# Patient Record
Sex: MALE | Race: WHITE
[De-identification: names, ages, dates, MRNs, and addresses within clinical notes are randomized per-mention and may not be internally consistent; named-entity substitution may affect disease eponyms.]

---

## 2017-05-02 ENCOUNTER — HOSPITAL ENCOUNTER (OUTPATIENT)
Dept: HOSPITAL 57 - BURCT | Age: 57
Discharge: HOME | End: 2017-05-02
Attending: FAMILY MEDICINE
Payer: COMMERCIAL

## 2017-05-02 DIAGNOSIS — G43.109: Primary | ICD-10-CM

## 2017-05-02 PROCEDURE — 70470 CT HEAD/BRAIN W/O & W/DYE: CPT

## 2017-05-03 NOTE — CT
CT OF THE BRAIN WITH AND WITHOUT CONTRAST:

 

Date: 5-2-17 

 

Technique: Scans were done pre and post IV contrast for evaluation of ocular migraines. 

 

FINDINGS: 

The ventricles are normal in size with no shift. No intracranial bleeding, mass, or sign of acute st
roke was found. Once IV contrast was given, there were no areas of pathological enhancement. The sku
ll appears normal. The visualize paranasal sinuses and mastoid air cells are clear. 

 

IMPRESSION: 

Unremarkable CT of the brain. 

 

POS: HOME

## 2018-01-23 ENCOUNTER — HOSPITAL ENCOUNTER (OUTPATIENT)
Dept: HOSPITAL 57 - BURRAD | Age: 58
Discharge: HOME | End: 2018-01-23
Attending: FAMILY MEDICINE
Payer: COMMERCIAL

## 2018-01-23 DIAGNOSIS — J40: Primary | ICD-10-CM

## 2018-01-23 PROCEDURE — 71046 X-RAY EXAM CHEST 2 VIEWS: CPT

## 2019-07-08 ENCOUNTER — HOSPITAL ENCOUNTER (OUTPATIENT)
Dept: HOSPITAL 57 - BURRAD | Age: 59
Discharge: HOME | End: 2019-07-08
Attending: NURSE PRACTITIONER
Payer: COMMERCIAL

## 2019-07-08 DIAGNOSIS — R09.89: Primary | ICD-10-CM

## 2019-07-08 PROCEDURE — 71046 X-RAY EXAM CHEST 2 VIEWS: CPT

## 2019-07-08 NOTE — RAD
CHEST TWO VIEWS:

 

07/08/2019

 

COMPARISON:

01/23/2018

 

FINDINGS:

The heart is normal in size.  No acute infiltrate or effusion is seen.  Mild elevation of the right h
emidiaphragm is no different than before.  A small, subcentimeter nodular density is seen to the left
 of the left pulmonary artery.  It has not changed over time and is most likely a granuloma.

 

IMPRESSION:

No significant thoracic finding.

 

POS: HOME

## 2020-12-04 ENCOUNTER — HOSPITAL ENCOUNTER (OUTPATIENT)
Dept: HOSPITAL 57 - BURCT | Age: 60
Discharge: HOME | End: 2020-12-04
Payer: COMMERCIAL

## 2020-12-04 DIAGNOSIS — D35.2: Primary | ICD-10-CM

## 2020-12-04 DIAGNOSIS — R51.9: ICD-10-CM

## 2020-12-04 DIAGNOSIS — T67.6XXA: ICD-10-CM

## 2020-12-04 LAB
ALBUMIN SERPL BCG-MCNC: 4.4 G/DL
ALBUMIN SERPL BCG-MCNC: 4.5 G/DL (ref 3.5–5)
ALP SERPL-CCNC: 63 U/L (ref 40–110)
ALT SERPL W P-5'-P-CCNC: 35 U/L (ref 8–55)
ANION GAP SERPL CALC-SCNC: 14 MMOL/L (ref 10–20)
AST SERPL-CCNC: 21 U/L (ref 5–34)
BILIRUB SERPL-MCNC: 1.1 MG/DL (ref 0.2–1.2)
BUN SERPL-MCNC: 11 MG/DL (ref 8.4–25.7)
CALCIUM SERPL-MCNC: 9.3 MG/DL (ref 7.8–10.44)
CHD RISK SERPL-RTO: 4 (ref ?–4.5)
CHLORIDE SERPL-SCNC: 99 MMOL/L (ref 98–107)
CHOLEST SERPL-MCNC: 198 MG/DL
CO2 SERPL-SCNC: 28 MMOL/L (ref 22–29)
CREAT CL PREDICTED SERPL C-G-VRATE: 0 ML/MIN (ref 70–130)
FERRITIN SERPL-MCNC: 259.38 NG/ML (ref 22–322)
FOLLICLE STIMULATING HORMONE: 4.3 MIU/ML
GLOBULIN SER CALC-MCNC: 2.9 G/DL (ref 2.4–3.5)
GLUCOSE SERPL-MCNC: 97 MG/DL (ref 70–105)
HDLC SERPL-MCNC: 49 MG/DL
HGB BLD-MCNC: 15.8 G/DL (ref 14–18)
LDLC SERPL CALC-MCNC: 130 MG/DL
MCH RBC QN AUTO: 32.7 PG (ref 27–31)
MCV RBC AUTO: 101 FL (ref 78–98)
PLATELET # BLD AUTO: 212 THOU/UL (ref 130–400)
POTASSIUM SERPL-SCNC: 3.9 MMOL/L (ref 3.5–5.1)
RBC # BLD AUTO: 4.82 MILL/UL (ref 4.7–6.1)
SODIUM SERPL-SCNC: 137 MMOL/L (ref 136–145)
T4 FREE SERPL-MCNC: 1.03 NG/DL (ref 0.7–1.48)
TESTOST FREE SERPL-MCNC: 71.5 PG/ML (ref 47–244)
TRIGL SERPL-MCNC: 93 MG/DL (ref ?–150)
TSH SERPL DL<=0.005 MIU/L-ACNC: 1.54 UIU/ML (ref 0.35–4.94)
WBC # BLD AUTO: 8.3 THOU/UL (ref 4.8–10.8)

## 2020-12-04 PROCEDURE — 83835 ASSAY OF METANEPHRINES: CPT

## 2020-12-04 PROCEDURE — 84439 ASSAY OF FREE THYROXINE: CPT

## 2020-12-04 PROCEDURE — 70470 CT HEAD/BRAIN W/O & W/DYE: CPT

## 2020-12-04 PROCEDURE — 80053 COMPREHEN METABOLIC PANEL: CPT

## 2020-12-04 PROCEDURE — 82728 ASSAY OF FERRITIN: CPT

## 2020-12-04 PROCEDURE — 84146 ASSAY OF PROLACTIN: CPT

## 2020-12-04 PROCEDURE — 36415 COLL VENOUS BLD VENIPUNCTURE: CPT

## 2020-12-04 PROCEDURE — 83002 ASSAY OF GONADOTROPIN (LH): CPT

## 2020-12-04 PROCEDURE — 80061 LIPID PANEL: CPT

## 2020-12-04 PROCEDURE — 84403 ASSAY OF TOTAL TESTOSTERONE: CPT

## 2020-12-04 PROCEDURE — 83540 ASSAY OF IRON: CPT

## 2020-12-04 PROCEDURE — 83036 HEMOGLOBIN GLYCOSYLATED A1C: CPT

## 2020-12-04 PROCEDURE — 84270 ASSAY OF SEX HORMONE GLOBUL: CPT

## 2020-12-04 PROCEDURE — 82670 ASSAY OF TOTAL ESTRADIOL: CPT

## 2020-12-04 PROCEDURE — 84443 ASSAY THYROID STIM HORMONE: CPT

## 2020-12-04 PROCEDURE — 85027 COMPLETE CBC AUTOMATED: CPT

## 2020-12-04 PROCEDURE — 83550 IRON BINDING TEST: CPT

## 2020-12-04 PROCEDURE — 85652 RBC SED RATE AUTOMATED: CPT

## 2020-12-04 PROCEDURE — 86376 MICROSOMAL ANTIBODY EACH: CPT

## 2020-12-04 PROCEDURE — 82533 TOTAL CORTISOL: CPT

## 2020-12-04 PROCEDURE — 83001 ASSAY OF GONADOTROPIN (FSH): CPT

## 2020-12-04 NOTE — CT
CT OF THE BRAIN WITH AND WITHOUT CONTRAST

12/4/20

 

Spiral CT of the brain was done for evaluation of headaches. Comparison is made with a prior CT dated
 5/2/17. The ventricles are normal in size and unchanged over time. No intracranial bleeding, mass or
 sign of acute stroke was found. Once IV contrast was given, there were no areas of pathological enha
ncement. 

 

The skull is normal in appearance. The visible paranasal sinuses and mastoid air cells are clear. The
re may be a small polyp in the lateral wall of the left maxillary sinus. 

 

IMPRESSION: 

No acute intracranial findings. 

 

POS: HOME

## 2020-12-05 LAB
IRON SERPL-MCNC: 147 UG/DL (ref 65–175)
UIBC SERPL-MCNC: 345 MCG/DL (ref 261–462)

## 2020-12-06 LAB
ELIA THY NEW METHOD: (no result)
THYROID PEROXIDASE ELIA INTERP: (no result)
THYROPEROXIDASE AB SERPL IA-ACNC: (no result) IU/ML